# Patient Record
(demographics unavailable — no encounter records)

---

## 2025-02-04 NOTE — PHYSICAL EXAM
[Well Nourished] : well nourished [Well Developed] : well developed [Well-Appearing] : well-appearing [Normal Sclera/Conjunctiva] : normal sclera/conjunctiva [PERRL] : pupils equal round and reactive to light [EOMI] : extraocular movements intact [Normal Outer Ear/Nose] : the outer ears and nose were normal in appearance [Normal Oropharynx] : the oropharynx was normal [No JVD] : no jugular venous distention [No Lymphadenopathy] : no lymphadenopathy [Supple] : supple [Thyroid Normal, No Nodules] : the thyroid was normal and there were no nodules present [No Respiratory Distress] : no respiratory distress  [No Accessory Muscle Use] : no accessory muscle use [Clear to Auscultation] : lungs were clear to auscultation bilaterally [Normal Rate] : normal rate  [Regular Rhythm] : with a regular rhythm [Normal S1, S2] : normal S1 and S2 [No Murmur] : no murmur heard [No Carotid Bruits] : no carotid bruits [No Abdominal Bruit] : a ~M bruit was not heard ~T in the abdomen [No Varicosities] : no varicosities [Pedal Pulses Present] : the pedal pulses are present [No Edema] : there was no peripheral edema [No Palpable Aorta] : no palpable aorta [No Extremity Clubbing/Cyanosis] : no extremity clubbing/cyanosis [Normal Posterior Cervical Nodes] : no posterior cervical lymphadenopathy [Normal Anterior Cervical Nodes] : no anterior cervical lymphadenopathy [No CVA Tenderness] : no CVA  tenderness [No Spinal Tenderness] : no spinal tenderness [No Joint Swelling] : no joint swelling [Grossly Normal Strength/Tone] : grossly normal strength/tone [No Rash] : no rash [Coordination Grossly Intact] : coordination grossly intact [No Focal Deficits] : no focal deficits [Normal Gait] : normal gait [Deep Tendon Reflexes (DTR)] : deep tendon reflexes were 2+ and symmetric [Normal Affect] : the affect was normal [Normal Insight/Judgement] : insight and judgment were intact [de-identified] : c/w 31w pregnant

## 2025-02-04 NOTE — PAST MEDICAL HISTORY
[Definite ___ (Date)] : the last menstrual period was [unfilled] [Total Preg ___] : G[unfilled] [Living ___] : Living: [unfilled] [Menstruating] : menstruating [de-identified] : currently pregnant

## 2025-02-04 NOTE — ASSESSMENT
[FreeTextEntry1] : 34 y/o F originally from [place], , 31w pregnant w PMHx of HLD, anemia, secondary infertility (sees Endo & GYN), seasonal allergies presenting today for work-related physical exam clearance.   # Health screening for work: UA drug screen negative; HB Ab negative - needs HBV vaccine post delivery - needs influenza paperwork from [facility] where she received vaccine - Quantiferon test ordered today   # HCM: 2024 -blood and UA done 2024 - Pap + HPV co-test: 2022 - HIV, HCV neg 2022  # Anemia - iron studies wnl in 2024  # Secondary infertility - Endocrinologist: Dr. Rice - OBGYN: Dr Elliott - SHBG low, likely 2/2 body weight - GH low, 0.7 - DHEAS 399, elevated; usually from DHEAS - Endo recs: recommending abdominal MRI to r/o adrenal masses  # Seasonal Allergies: -Seen by Allergist

## 2025-02-04 NOTE — HISTORY OF PRESENT ILLNESS
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 Denies any complaints except some dyspnea upon exertion.  Endocrinologist: Dr. Rice OBBLANCO: Dr Elliott; next [address] Previous PCP: Dr. Stringer  . 1 daughter 9 y/o. Works at [employer]; last date was 25 due to maternity leave

## 2025-02-04 NOTE — REVIEW OF SYSTEMS
[Lower Ext Edema] : lower extremity edema [Dyspnea on Exertion] : dyspnea on exertion [Fever] : no fever [Chills] : no chills [Vision Problems] : no vision problems [Hearing Loss] : no hearing loss [Chest Pain] : no chest pain [Palpitations] : no palpitations [Shortness Of Breath] : no shortness of breath [Wheezing] : no wheezing [Cough] : no cough [Nausea] : no nausea [Vomiting] : no vomiting [Dysuria] : no dysuria [Hematuria] : no hematuria [Skin Rash] : no skin rash [Suicidal] : not suicidal [Depression] : no depression [Easy Bleeding] : no easy bleeding [Easy Bruising] : no easy bruising [FreeTextEntry5] : some LE edema [FreeTextEntry6] : occasional SOB on exertion

## 2025-02-04 NOTE — HEALTH RISK ASSESSMENT
[No] : In the past 12 months have you used drugs other than those required for medical reasons? No [No falls in past year] : Patient reported no falls in the past year [0] : 2) Feeling down, depressed, or hopeless: Not at all (0) [PHQ-2 Negative - No further assessment needed] : PHQ-2 Negative - No further assessment needed [With Patient/Caregiver] : , with patient/caregiver [Designated Healthcare Proxy] : Designated healthcare proxy [Name: ___] : Health Care Proxy's Name: [unfilled]  [Relationship: ___] : Relationship: [unfilled] [Never] : Never [de-identified] : GYN, Endo, Immunology [de-identified] : no [de-identified] : no [[id number]] : 0 [AdvancecareDate] : 02/2025